# Patient Record
Sex: FEMALE | Race: WHITE | NOT HISPANIC OR LATINO | URBAN - METROPOLITAN AREA
[De-identification: names, ages, dates, MRNs, and addresses within clinical notes are randomized per-mention and may not be internally consistent; named-entity substitution may affect disease eponyms.]

---

## 2017-02-10 ENCOUNTER — IMPORTED ENCOUNTER (OUTPATIENT)
Dept: URBAN - METROPOLITAN AREA CLINIC 1 | Facility: CLINIC | Age: 67
End: 2017-02-10

## 2017-02-10 PROBLEM — H04.123: Noted: 2017-02-10

## 2017-02-10 PROBLEM — H43.811: Noted: 2017-02-10

## 2017-02-10 PROBLEM — H16.143: Noted: 2017-02-10

## 2017-02-10 PROBLEM — H25.813: Noted: 2017-02-10

## 2017-02-10 PROCEDURE — 92012 INTRM OPH EXAM EST PATIENT: CPT

## 2017-02-10 NOTE — PATIENT DISCUSSION
1.  PVD w/o Tear OD - Patient was cautioned to call our office immediately if they experience   a substantial change in their symptoms such as an increase in floaters persistent flashes loss of visual field (may appear as a shadow or a curtain) or decrease in visual acuity as these may indicate a retinal tear or detachment. 2.  STANLEY w/ increased PEK OU- Recommend increase ATs QID OU routinely 3. Cataract OU: Observe for now without intervention. The patient was advised to contact us if any change or worsening of vision4. H/o PVD OS 5. H/o Plaquenil screening without evidence of Toxicity. Return for an appointment in 10/dfe/glapedro luis/10-2 [facility] for Return as scheduled with Dr. Perla La.

## 2017-02-15 ENCOUNTER — IMPORTED ENCOUNTER (OUTPATIENT)
Dept: URBAN - METROPOLITAN AREA CLINIC 1 | Facility: CLINIC | Age: 67
End: 2017-02-15

## 2017-02-15 PROBLEM — H53.19: Noted: 2017-02-15

## 2017-02-15 PROBLEM — H04.123: Noted: 2017-02-15

## 2017-02-15 PROBLEM — H43.813: Noted: 2017-02-15

## 2017-02-15 PROBLEM — H25.813: Noted: 2017-02-15

## 2017-02-15 PROBLEM — H16.143: Noted: 2017-02-15

## 2017-02-15 PROCEDURE — 92012 INTRM OPH EXAM EST PATIENT: CPT

## 2017-02-15 NOTE — PATIENT DISCUSSION
1.  PVD w/o Tear OU with flashing Photopsia OD: Patient was cautioned to call our office immediately if they experience a substantial change in their symptoms such as an increase in floaters persistent flashes loss of visual field (may appear as a shadow or a curtain) or decrease in visual acuity as these may indicate a retinal tear or detachment. 2.  STANLEY w/ PEK OU-The use/continuation of artificial tears were recommended. 3.  Cataract OU: Observe for now without intervention. The patient was advised to contact us if any change or worsening of vision4. H/o Plaquenil screening without evidence of Toxicity. Return for an appointment for Return as scheduled with Dr. Zi Khalil.

## 2017-03-03 ENCOUNTER — IMPORTED ENCOUNTER (OUTPATIENT)
Dept: URBAN - METROPOLITAN AREA CLINIC 1 | Facility: CLINIC | Age: 67
End: 2017-03-03

## 2017-03-03 PROBLEM — H25.813: Noted: 2017-03-03

## 2017-03-03 PROBLEM — H04.123: Noted: 2017-03-03

## 2017-03-03 PROBLEM — Z79.899: Noted: 2017-03-03

## 2017-03-03 PROBLEM — H43.813: Noted: 2017-03-03

## 2017-03-03 PROBLEM — H16.143: Noted: 2017-03-03

## 2017-03-03 PROCEDURE — 92015 DETERMINE REFRACTIVE STATE: CPT

## 2017-03-03 PROCEDURE — 92083 EXTENDED VISUAL FIELD XM: CPT

## 2017-03-03 PROCEDURE — 83861 MICROFLUID ANALY TEARS: CPT

## 2017-03-03 PROCEDURE — 92012 INTRM OPH EXAM EST PATIENT: CPT

## 2017-03-03 NOTE — PATIENT DISCUSSION
1.  Lupus on High Risk Medication- Plaquenil without evidence of Toxicity or Plaquenil retinopathy. Given possible HVF defects OU will repeat HVF in 6 months. [id number] Susie Galvez for patient to remain on Plaquenil. 2.  STANLEY w/ decreased PEK OU- Improving. Moderate per tear lab OU. The increase of artificial tears OU Q2hwa were recommended. Cauterized LL's OU. Consider Restasis w/o improvement. 3. Cataract OU: Observe for now without intervention. The patient was advised to contact us if any change or worsening of vision4. PVD OU- old/stable. 5. Return for an appointment for a 30/Plaq HVF in 6 months with Dr. Ludmila Gastelum.

## 2017-09-12 ENCOUNTER — IMPORTED ENCOUNTER (OUTPATIENT)
Dept: URBAN - METROPOLITAN AREA CLINIC 1 | Facility: CLINIC | Age: 67
End: 2017-09-12

## 2017-09-12 PROBLEM — M32.9: Noted: 2017-09-12

## 2017-09-12 PROBLEM — H16.143: Noted: 2017-09-12

## 2017-09-12 PROBLEM — Z79.899: Noted: 2017-09-12

## 2017-09-12 PROBLEM — H25.813: Noted: 2017-09-12

## 2017-09-12 PROBLEM — H04.123: Noted: 2017-09-12

## 2017-09-12 PROBLEM — H43.813: Noted: 2017-09-12

## 2017-09-12 PROCEDURE — 92083 EXTENDED VISUAL FIELD XM: CPT

## 2017-09-12 PROCEDURE — 92015 DETERMINE REFRACTIVE STATE: CPT

## 2017-09-12 PROCEDURE — 92014 COMPRE OPH EXAM EST PT 1/>: CPT

## 2017-09-12 NOTE — PATIENT DISCUSSION
1.  High Risk Medication- Plaquenil due to systemic Lupus without evidence of Toxicity or Plaquenil retinopathy on High Risk Medication. [id number] Susie Galvez for patient to remain on Plaquenil. VF shows no progression OU today 2. STANLEY w/ PEK OU- (H/o Cautery LLs OU) Cont ATs TID OU routinely. 3.  Cataract OU: Observe for now without intervention. The patient was advised to contact us if any change or worsening of vision4. PVD OU - RD precautions. Letter to PCP MRX for glasses/ CLs given Return for an appointment in 6 mo 10 dfe VF 10-2 glare OU with Dr. Carlos Ott.

## 2018-11-01 ENCOUNTER — IMPORTED ENCOUNTER (OUTPATIENT)
Dept: URBAN - METROPOLITAN AREA CLINIC 1 | Facility: CLINIC | Age: 68
End: 2018-11-01

## 2018-11-01 PROBLEM — H43.813: Noted: 2018-11-01

## 2018-11-01 PROBLEM — Z79.899: Noted: 2018-11-01

## 2018-11-01 PROBLEM — H04.123: Noted: 2018-11-01

## 2018-11-01 PROBLEM — H16.143: Noted: 2018-11-01

## 2018-11-01 PROBLEM — H25.813: Noted: 2018-11-01

## 2018-11-01 PROCEDURE — 92015 DETERMINE REFRACTIVE STATE: CPT

## 2018-11-01 PROCEDURE — 92083 EXTENDED VISUAL FIELD XM: CPT

## 2018-11-01 PROCEDURE — 92012 INTRM OPH EXAM EST PATIENT: CPT

## 2018-11-01 NOTE — PATIENT DISCUSSION
1.  Cataract OU:  Visually Significant secondary to glare discussed the risks benefits alternatives and limitations of cataract surgery. The patient stated a full understanding and a desire to proceed with the procedure. The patient will need to return for preop appointment with cataract measurements and to have any additional questions answered and start pre-operative eye drops as directed. Schedule phaco PCLOtherwise follow-up in 1 year for a 30/HVf 10-2 OU/glare 2. Lupus on high risk med- Plaquenil- without evidence of Toxicity or Plaquenil retinopathy on High Risk Medication. Normal HVF and color VA OU. [id number] Susie Galvez for patient to remain on Plaquenil. 3.  STANLEY w/ PEK OU- Progressed OU. The continuation of artificial tears OU QID were recommended routinely. 4.  PVD OU- Old stable. 5.  Return for an appointment for [facility] H&P with Dr. Eryn Everett.

## 2018-11-01 NOTE — PATIENT DISCUSSION
Plaquenil without evidence of Toxicity or Plaquenil retinopathy on High Risk Medication. [id number] Susie Galvez for patient to remain on Plaquenil.

## 2018-11-26 ENCOUNTER — IMPORTED ENCOUNTER (OUTPATIENT)
Dept: URBAN - METROPOLITAN AREA CLINIC 1 | Facility: CLINIC | Age: 68
End: 2018-11-26

## 2018-11-26 PROBLEM — H25.811: Noted: 2018-11-26

## 2018-11-26 PROCEDURE — 92136 OPHTHALMIC BIOMETRY: CPT

## 2018-11-26 NOTE — PATIENT DISCUSSION
Cataract OD: Visually Significant secondary to glare discussed the risks benefits alternatives and limitations of cataract surgery. The patient stated a full understanding and a desire to proceed with the procedure. The patient will need to start pre-operative eye drops as directed. Proceed w/ phaco PCL OD Pt understands they will need glasses post-op to achieve their best corrected vision. Return for an appointment for sx OD with Dr. Anika Bain.

## 2018-12-05 ENCOUNTER — IMPORTED ENCOUNTER (OUTPATIENT)
Dept: URBAN - METROPOLITAN AREA CLINIC 1 | Facility: CLINIC | Age: 68
End: 2018-12-05

## 2018-12-06 ENCOUNTER — IMPORTED ENCOUNTER (OUTPATIENT)
Dept: URBAN - METROPOLITAN AREA CLINIC 1 | Facility: CLINIC | Age: 68
End: 2018-12-06

## 2018-12-06 PROBLEM — Z96.1: Noted: 2018-12-06

## 2018-12-06 PROCEDURE — 99024 POSTOP FOLLOW-UP VISIT: CPT

## 2018-12-06 NOTE — PATIENT DISCUSSION
POD#1 CE/IOL Toric OD doing well. Continue all 3 gtts as prescribed and until gone.  Durezol BID OD Ilevro Qdaily OD Ocuflox TID OD Post op Warnings Reiterated RTC as scheduled

## 2018-12-11 ENCOUNTER — IMPORTED ENCOUNTER (OUTPATIENT)
Dept: URBAN - METROPOLITAN AREA CLINIC 1 | Facility: CLINIC | Age: 68
End: 2018-12-11

## 2018-12-11 PROBLEM — Z96.1: Noted: 2018-12-11

## 2018-12-11 PROBLEM — H25.812: Noted: 2018-12-11

## 2018-12-11 PROCEDURE — 92136 OPHTHALMIC BIOMETRY: CPT

## 2018-12-11 NOTE — PATIENT DISCUSSION
1.  Cataract OS: Visually Significant secondary to glare discussed the risks benefits alternatives and limitations of cataract surgery. The patient stated a full understanding and a desire to proceed with the procedure. The patient will need to start pre-operative eye drops as directed. Proceed w/ phaco PCL OS Pt understands they will need glasses post-op to achieve their best corrected vision. 2.  POW#1  CE/IOL Toric OD doing well. Discontinue OcufloxContinue Durezol BID until gone. Continue Acular QD until gone. Return for an appointment for sx OS with Dr. Rufina Sinha.

## 2018-12-11 NOTE — PATIENT DISCUSSION
POW#1  CE/IOL OD doing well. Discontinue OcufloxContinue Lotemax/Durezol/Prednisolone BID until gone. Continue Prolensa/Ilevro/Acular QD until gone.

## 2018-12-19 ENCOUNTER — IMPORTED ENCOUNTER (OUTPATIENT)
Dept: URBAN - METROPOLITAN AREA CLINIC 1 | Facility: CLINIC | Age: 68
End: 2018-12-19

## 2018-12-20 ENCOUNTER — IMPORTED ENCOUNTER (OUTPATIENT)
Dept: URBAN - METROPOLITAN AREA CLINIC 1 | Facility: CLINIC | Age: 68
End: 2018-12-20

## 2018-12-20 PROBLEM — Z96.1: Noted: 2018-12-20

## 2018-12-20 PROCEDURE — 99024 POSTOP FOLLOW-UP VISIT: CPT

## 2018-12-20 NOTE — PATIENT DISCUSSION
POD#1 CE/IOL OS doing well. Continue all 3 gtts as prescribed and until gone. Post op Warnings Reiterated RTC as scheduled

## 2019-01-10 ENCOUNTER — IMPORTED ENCOUNTER (OUTPATIENT)
Dept: URBAN - METROPOLITAN AREA CLINIC 1 | Facility: CLINIC | Age: 69
End: 2019-01-10

## 2019-01-10 PROBLEM — Z09: Noted: 2019-01-10

## 2019-01-10 PROCEDURE — 99024 POSTOP FOLLOW-UP VISIT: CPT

## 2019-01-10 NOTE — PATIENT DISCUSSION
POW#3 Phaco/ PCL OU (Toric OU) doing well *Midrange OD* Finish PO meds per schedule MRX for glasses givenReturn for an appointment in November 30 VF 10-2 OU with Dr. Claudeen Cobble.

## 2019-10-25 ENCOUNTER — IMPORTED ENCOUNTER (OUTPATIENT)
Dept: URBAN - METROPOLITAN AREA CLINIC 1 | Facility: CLINIC | Age: 69
End: 2019-10-25

## 2019-10-25 PROBLEM — H00.032: Noted: 2019-10-25

## 2019-10-25 PROCEDURE — 92012 INTRM OPH EXAM EST PATIENT: CPT

## 2019-10-25 NOTE — PATIENT DISCUSSION
1. Abscessed Chalazion RLL - Begin Doxycycline 50mg po BID (Disp 60) Rx sent to patient's pharmacy). Begin Hot compresses TID x 5 minutes for 3 weeks. If without improvement discussed with patient possible Incision and Drainage procedure. Risks and benefits discussed with patient and patient states full understanding. Return as scheduled with Dr. Melanie Brooks.

## 2019-11-07 ENCOUNTER — IMPORTED ENCOUNTER (OUTPATIENT)
Dept: URBAN - METROPOLITAN AREA CLINIC 1 | Facility: CLINIC | Age: 69
End: 2019-11-07

## 2019-11-07 PROBLEM — H04.123: Noted: 2019-11-07

## 2019-11-07 PROBLEM — H16.143: Noted: 2019-11-07

## 2019-11-07 PROBLEM — H35.033: Noted: 2019-11-07

## 2019-11-07 PROBLEM — H01.001: Noted: 2019-11-07

## 2019-11-07 PROBLEM — H01.004: Noted: 2019-11-07

## 2019-11-07 PROBLEM — H43.813: Noted: 2019-11-07

## 2019-11-07 PROBLEM — M32.9: Noted: 2019-11-07

## 2019-11-07 PROBLEM — Z79.899: Noted: 2019-11-07

## 2019-11-07 PROBLEM — H00.12: Noted: 2019-11-07

## 2019-11-07 PROBLEM — Z96.1: Noted: 2019-11-07

## 2019-11-07 PROCEDURE — 92083 EXTENDED VISUAL FIELD XM: CPT

## 2019-11-07 PROCEDURE — 92014 COMPRE OPH EXAM EST PT 1/>: CPT

## 2019-11-07 NOTE — PATIENT DISCUSSION
1.   Plaquenil for Lupus without evidence of Toxicity or Plaquenil retinopathy on High Risk Medication. [id number] Susie Galvez for patient to remain on Plaquenil. 2.  Chalazion right lower eyelid - Much improved. Decrease Doxycycline PO QD until gone. Hot compresses TID x 5 minutes for 3 weeks. If without improvement discussed with patient possible Incision and Drainage procedure. Risks and benefits discussed with patient and patient states full understanding. 3. STANLEY w/ PEK OU- (LL cauterized OU) Recommend ATs TID OU routinely 4. Pseudophakia OU - (Toric OU) *Mid-Range OD 5. Anterior Blepharitis OU - Daily Hot compresses and lid scrubs were recommended. 6. GR I Hypertensive Retinopathy OU- Stable continue HTN Control7. PVD OU - RD precautions. Patient deferred Manifest Rx today. Return for an appointment in 1 year 30/10-2 HVF with Dr. Meagan Clarke.

## 2020-11-12 ENCOUNTER — IMPORTED ENCOUNTER (OUTPATIENT)
Dept: URBAN - METROPOLITAN AREA CLINIC 1 | Facility: CLINIC | Age: 70
End: 2020-11-12

## 2020-11-12 PROBLEM — Z79.899: Noted: 2020-11-12

## 2020-11-12 PROBLEM — H16.143: Noted: 2020-11-12

## 2020-11-12 PROBLEM — H04.123: Noted: 2020-11-12

## 2020-11-12 PROBLEM — H35.3131: Noted: 2020-11-12

## 2020-11-12 PROCEDURE — 92083 EXTENDED VISUAL FIELD XM: CPT

## 2020-11-12 PROCEDURE — 92014 COMPRE OPH EXAM EST PT 1/>: CPT

## 2020-11-12 NOTE — PATIENT DISCUSSION
1.   Plaquenil for Lupus without evidence of Toxicity or Plaquenil retinopathy on High Risk Medication. HVF WNL OU today. [id number] Susie Galvez for patient to remain on Plaquenil. 2.  ARMD OU *very early*/dry/stable. Importance of daily AREDS II study multivitamin and Amsler Grid checks discussed with patient. Patient to follow-up immediately with any new onset of decreased vision and/or metamorphopsia. 3.  STANLEY w/ PEK OU- (LL cauterized OU) Recommend ATs TID OU routinely 4. Pseudophakia OU - (Toric OU) *Mid-Range OD 5. Anterior Blepharitis OU - Daily Hot compresses and lid scrubs were recommended. 6. Chalazion right lower eyelid - Stable. Hot compresses TID x 5 minutes PRN. 7.  GR I Hypertensive Retinopathy OU- Stable continue HTN Control8. PVD OU - old stable. RD precautions. Patient deferred Manifest Rx today. Return for an appointment in 6 months 10/DFE/[place] with Dr. Marcelle Mims.

## 2021-05-13 ENCOUNTER — IMPORTED ENCOUNTER (OUTPATIENT)
Dept: URBAN - METROPOLITAN AREA CLINIC 1 | Facility: CLINIC | Age: 71
End: 2021-05-13

## 2021-05-13 PROBLEM — H35.3131: Noted: 2021-05-13

## 2021-05-13 PROBLEM — H04.123: Noted: 2021-05-13

## 2021-05-13 PROBLEM — H16.143: Noted: 2021-05-13

## 2021-05-13 PROCEDURE — 92250 FUNDUS PHOTOGRAPHY W/I&R: CPT

## 2021-05-13 PROCEDURE — 99213 OFFICE O/P EST LOW 20 MIN: CPT

## 2021-05-13 NOTE — PATIENT DISCUSSION
1.  ARMD OU Early/dry/stable. Importance of daily AREDS II study multivitamin and Amsler Grid checks discussed with patient. Patient to follow-up immediately with any new onset of decreased vision and/or metamorphopsia. 2. STANLEY w/ PEK OU- (LL cauterized OU) Recommend ATs QID OU routinely 3. Pseudophakia OU - (Toric OU) *Mid-Range OD 4. Anterior Blepharitis OU - Daily Hot compresses and lid scrubs were recommended. 5. GR I Hypertensive Retinopathy OU- Stable continue HTN Control6. PVD OU - old stable. RD precautions. 7.  Plaquenil use for LupusReturn for an appointment in 6 months 30/10-2 HVF with Dr. Zahra Lindsey.

## 2021-11-23 ENCOUNTER — IMPORTED ENCOUNTER (OUTPATIENT)
Dept: URBAN - METROPOLITAN AREA CLINIC 1 | Facility: CLINIC | Age: 71
End: 2021-11-23

## 2021-11-23 ENCOUNTER — PREPPED CHART (OUTPATIENT)
Dept: URBAN - METROPOLITAN AREA CLINIC 1 | Facility: CLINIC | Age: 71
End: 2021-11-23

## 2021-11-23 PROBLEM — H04.123: Noted: 2021-11-23

## 2021-11-23 PROBLEM — Z79.899: Noted: 2021-11-23

## 2021-11-23 PROBLEM — H35.3131: Noted: 2021-11-23

## 2021-11-23 PROBLEM — H16.143: Noted: 2021-11-23

## 2021-11-23 PROCEDURE — 99214 OFFICE O/P EST MOD 30 MIN: CPT

## 2021-11-23 PROCEDURE — 92083 EXTENDED VISUAL FIELD XM: CPT

## 2021-11-23 NOTE — PATIENT DISCUSSION
1.   Plaquenil without evidence of Toxicity or Plaquenil retinopathy on High Risk Medication for Lupus. [id number] Susie Galvez for patient to remain on Plaquenil. 2.  ARMD OU early/dry/stable. Importance of daily AREDS II study multivitamin and Amsler Grid checks discussed with patient. Patient to follow-up immediately with any new onset of decreased vision and/or metamorphopsia. 3. STANLEY w/ PEK OU- (LL cauterized OU) Recommend ATs QID OU routinely 4. Pseudophakia OU - (Toric OU) *Mid-Range OD 5. Anterior Blepharitis OU - Daily Hot compresses and lid scrubs were recommended. 6. GR I Hypertensive Retinopathy OU- Stable continue HTN Control7. PVD OU - old stable. RD precautions. Return for an appointment in 6 months DFE with Dr. Fabiana Jasmine.

## 2022-02-02 ENCOUNTER — IMPORTED ENCOUNTER (OUTPATIENT)
Dept: URBAN - METROPOLITAN AREA CLINIC 1 | Facility: CLINIC | Age: 72
End: 2022-02-02

## 2022-02-02 PROBLEM — H10.021: Noted: 2022-02-02

## 2022-02-02 PROCEDURE — 99213 OFFICE O/P EST LOW 20 MIN: CPT

## 2022-02-02 NOTE — PATIENT DISCUSSION
1.  Bacterial Conjunctivitis OD -- Begin Tobradex QID OD (Erx'd). Per RBF if pt feels discomfort OS along the same symptoms as OD begin Tobradex QID OU. The condition was discussed with the patient. The mechanism of transmission was explained. The patient was advised to wash his hands and use separate towels and bedding. 2. STANLEY w/ increased PEK OU (LL cauterized OU) -- Switch to PF ATs and increase to Q2Hrs OU. Recommend Gel ATs QHS OU. 3.  Anterior Blepharitis OU -- Daily Hot compresses and lid scrubs were recommended. 4. Pseudophakia OU (Toric OU; *Mid-Range OD) 5. H/o Plaquenil (Lupus) w/o Toxicity 6. H/o ARMD OU Early/dry/stable. 7.  H/o GR I Hypertensive Retinopathy OU 8. H/o PVD OU  Return for an appointment in 1 week 10 with Dr. Arpan Castrejon.

## 2022-02-10 ASSESSMENT — TONOMETRY
OS_IOP_MMHG: 15
OD_IOP_MMHG: 14

## 2022-02-10 ASSESSMENT — VISUAL ACUITY
OS_CC: 20/20
OD_CC: 20/20

## 2022-02-11 ENCOUNTER — FOLLOW UP (OUTPATIENT)
Dept: URBAN - METROPOLITAN AREA CLINIC 1 | Facility: CLINIC | Age: 72
End: 2022-02-11

## 2022-02-11 DIAGNOSIS — H04.123: ICD-10-CM

## 2022-02-11 DIAGNOSIS — H10.023: ICD-10-CM

## 2022-02-11 DIAGNOSIS — H16.143: ICD-10-CM

## 2022-02-11 PROCEDURE — 99213 OFFICE O/P EST LOW 20 MIN: CPT

## 2022-02-11 ASSESSMENT — VISUAL ACUITY
OD_SC: 20/100
OD_PH: 20/20
OS_SC: 20/20-1

## 2022-02-11 ASSESSMENT — TONOMETRY
OD_IOP_MMHG: 14
OS_IOP_MMHG: 14

## 2022-02-11 NOTE — PATIENT DISCUSSION
Improving. Since last exam PT started Tobradex in OS also. Decrease Tobradex to BID OU x 1 week then D/c.

## 2022-03-12 ASSESSMENT — TONOMETRY
OS_IOP_MMHG: 14
OD_IOP_MMHG: 14
OS_IOP_MMHG: 15
OD_IOP_MMHG: 14
OD_IOP_MMHG: 15
OD_IOP_MMHG: 14
OD_IOP_MMHG: 13
OS_IOP_MMHG: 14
OS_IOP_MMHG: 16
OD_IOP_MMHG: 13
OD_IOP_MMHG: 14
OD_IOP_MMHG: 14
OS_IOP_MMHG: 15
OD_IOP_MMHG: 14
OS_IOP_MMHG: 13
OD_IOP_MMHG: 14
OD_IOP_MMHG: 15
OD_IOP_MMHG: 15
OS_IOP_MMHG: 15
OD_IOP_MMHG: 17
OS_IOP_MMHG: 13
OS_IOP_MMHG: 15
OS_IOP_MMHG: 15
OS_IOP_MMHG: 16
OD_IOP_MMHG: 16
OS_IOP_MMHG: 14

## 2022-03-12 ASSESSMENT — VISUAL ACUITY
OD_SC: 20/30
OS_SC: 20/30
OS_CC: 20/30-2
OS_CC: J1
OS_CC: 20/25
OD_CC: 20/40
OS_PH: SC 20/25 -2
OS_SC: 20/20
OS_CC: 20/20
OS_GLARE: 20/50
OD_CC: 20/100
OD_CC: 20/40
OS_CC: J1
OD_GLARE: 20/40
OD_SC: J1
OS_SC: 20/30
OD_SC: 20/25
OD_SC: 20/30
OD_SC: 20/20
OS_SC: 20/20
OD_CC: 20/100+1
OS_CC: J2
OD_SC: 20/25
OD_CC: J1
OD_SC: J1
OS_CC: 20/25
OS_SC: 20/25
OS_GLARE: 20/60
OS_GLARE: 20/40
OS_SC: 20/20
OD_SC: 20/20
OD_CC: 20/100
OS_SC: 20/30
OD_GLARE: 20/60
OD_SC: 20/20
OD_GLARE: 20/50
OD_CC: 20/60
OD_CC: 20/70
OD_CC: 20/40+1
OD_PH: SC 20/40
OD_SC: 20/30
OD_SC: 20/25
OD_CC: J1
OS_CC: 20/40
OS_CC: 20/25
OS_SC: 20/25

## 2022-03-12 ASSESSMENT — KERATOMETRY
OD_K1POWER_DIOPTERS: 43.75
OS_K2POWER_DIOPTERS: 43.25
OS_AXISANGLE_DEGREES: 141
OD_AXISANGLE2_DEGREES: 113
OD_AXISANGLE_DEGREES: 023
OD_K2POWER_DIOPTERS: 43.00
OS_K1POWER_DIOPTERS: 44.25
OS_AXISANGLE2_DEGREES: 051

## 2022-05-24 ENCOUNTER — COMPREHENSIVE EXAM (OUTPATIENT)
Dept: URBAN - METROPOLITAN AREA CLINIC 1 | Facility: CLINIC | Age: 72
End: 2022-05-24

## 2022-05-24 DIAGNOSIS — Z79.899: ICD-10-CM

## 2022-05-24 DIAGNOSIS — H35.3131: ICD-10-CM

## 2022-05-24 PROCEDURE — 99213 OFFICE O/P EST LOW 20 MIN: CPT

## 2022-05-24 ASSESSMENT — VISUAL ACUITY
OD_PH: 20/25
OD_CC: 20/20
OD_SC: 20/50
OS_SC: 20/20

## 2022-05-24 ASSESSMENT — TONOMETRY
OD_IOP_MMHG: 14
OS_IOP_MMHG: 14

## 2022-05-24 NOTE — PATIENT DISCUSSION
(without) Plaquenil without evidence of Toxicity or Plaquenil retinopathy on High Risk Medication. [id number] Susie Galvez for patient to remain on Plaquenil.

## 2022-12-01 ENCOUNTER — COMPREHENSIVE EXAM (OUTPATIENT)
Dept: URBAN - METROPOLITAN AREA CLINIC 1 | Facility: CLINIC | Age: 72
End: 2022-12-01

## 2022-12-01 PROCEDURE — 99214 OFFICE O/P EST MOD 30 MIN: CPT

## 2022-12-01 PROCEDURE — 92015 DETERMINE REFRACTIVE STATE: CPT

## 2022-12-01 PROCEDURE — 92083 EXTENDED VISUAL FIELD XM: CPT

## 2022-12-01 ASSESSMENT — VISUAL ACUITY
OD_SC: J2
OS_SC: 20/20
OS_CC: J1
OS_CC: 20/20
OD_CC: J1
OD_CC: 20/25-2

## 2022-12-01 ASSESSMENT — TONOMETRY
OS_IOP_MMHG: 14
OD_IOP_MMHG: 14

## 2022-12-01 NOTE — PATIENT DISCUSSION
HVF today with non-specific defects but essentially normal OU. Plaquenil without evidence of Toxicity or Plaquenil retinopathy on High Risk Medication. [id number] Susie Galvez for patient to remain on Plaquenil.

## 2022-12-01 NOTE — PATIENT DISCUSSION
Stable today. Importance of daily AREDS II study multivitamin and Amsler Grid checks discussed with patient. Patient to follow-up immediately with any new onset of decreased vision and/or metamorphopsia.

## 2022-12-01 NOTE — PATIENT DISCUSSION
Continue ATs QID-Q2Hrs OU, routinely. Patient would like to try Restasis for better symptom control and frequency of ATs. Begin Restasis BID OU (Erx'd). Recommend instilling AT 5 mins before Restasis. Condsider Cequa if Restasis not covered.

## 2023-07-13 ENCOUNTER — FOLLOW UP (OUTPATIENT)
Dept: URBAN - METROPOLITAN AREA CLINIC 1 | Facility: CLINIC | Age: 73
End: 2023-07-13

## 2023-07-13 DIAGNOSIS — H04.123: ICD-10-CM

## 2023-07-13 DIAGNOSIS — H35.3131: ICD-10-CM

## 2023-07-13 DIAGNOSIS — H16.143: ICD-10-CM

## 2023-07-13 PROCEDURE — 99213 OFFICE O/P EST LOW 20 MIN: CPT

## 2023-07-13 ASSESSMENT — VISUAL ACUITY
OS_SC: J2
OS_SC: 20/25
OD_SC: 20/30
OD_SC: J2

## 2023-07-13 ASSESSMENT — TONOMETRY
OS_IOP_MMHG: 13
OD_IOP_MMHG: 13

## 2024-01-22 ENCOUNTER — COMPREHENSIVE EXAM (OUTPATIENT)
Dept: URBAN - METROPOLITAN AREA CLINIC 1 | Facility: CLINIC | Age: 74
End: 2024-01-22

## 2024-01-22 DIAGNOSIS — H16.143: ICD-10-CM

## 2024-01-22 DIAGNOSIS — Z79.899: ICD-10-CM

## 2024-01-22 DIAGNOSIS — H35.3132: ICD-10-CM

## 2024-01-22 DIAGNOSIS — M32.10: ICD-10-CM

## 2024-01-22 DIAGNOSIS — H43.813: ICD-10-CM

## 2024-01-22 DIAGNOSIS — H04.123: ICD-10-CM

## 2024-01-22 PROCEDURE — 99214 OFFICE O/P EST MOD 30 MIN: CPT

## 2024-01-22 PROCEDURE — 92083 EXTENDED VISUAL FIELD XM: CPT

## 2024-01-22 ASSESSMENT — TONOMETRY
OS_IOP_MMHG: 14
OD_IOP_MMHG: 14

## 2024-01-22 ASSESSMENT — VISUAL ACUITY
OD_SC: 20/40
OS_SC: 20/25

## 2024-08-30 ENCOUNTER — FOLLOW UP (OUTPATIENT)
Dept: URBAN - METROPOLITAN AREA CLINIC 1 | Facility: CLINIC | Age: 74
End: 2024-08-30

## 2024-08-30 DIAGNOSIS — H35.3132: ICD-10-CM

## 2024-08-30 PROCEDURE — 99213 OFFICE O/P EST LOW 20 MIN: CPT

## 2024-08-30 PROCEDURE — 92134 CPTRZ OPH DX IMG PST SGM RTA: CPT

## 2024-08-30 ASSESSMENT — TONOMETRY
OS_IOP_MMHG: 14
OD_IOP_MMHG: 14

## 2024-08-30 ASSESSMENT — VISUAL ACUITY
OS_SC: J7
OD_SC: J7
OS_SC: 20/25-1
OD_SC: 20/25

## 2024-11-19 ENCOUNTER — EMERGENCY VISIT (OUTPATIENT)
Dept: URBAN - METROPOLITAN AREA CLINIC 1 | Facility: CLINIC | Age: 74
End: 2024-11-19

## 2024-11-19 ENCOUNTER — PREPPED CHART (OUTPATIENT)
Dept: URBAN - METROPOLITAN AREA CLINIC 1 | Facility: CLINIC | Age: 74
End: 2024-11-19

## 2024-11-19 DIAGNOSIS — H16.143: ICD-10-CM

## 2024-11-19 DIAGNOSIS — H04.123: ICD-10-CM

## 2024-11-19 DIAGNOSIS — Z96.1: ICD-10-CM

## 2024-11-19 PROCEDURE — 92015 DETERMINE REFRACTIVE STATE: CPT

## 2024-11-19 PROCEDURE — 99214 OFFICE O/P EST MOD 30 MIN: CPT

## 2025-03-10 ENCOUNTER — COMPREHENSIVE EXAM (OUTPATIENT)
Age: 75
End: 2025-03-10

## 2025-03-10 DIAGNOSIS — H35.033: ICD-10-CM

## 2025-03-10 DIAGNOSIS — Z98.890: ICD-10-CM

## 2025-03-10 DIAGNOSIS — Z96.1: ICD-10-CM

## 2025-03-10 DIAGNOSIS — H16.143: ICD-10-CM

## 2025-03-10 DIAGNOSIS — H35.3132: ICD-10-CM

## 2025-03-10 DIAGNOSIS — H01.024: ICD-10-CM

## 2025-03-10 DIAGNOSIS — H01.021: ICD-10-CM

## 2025-03-10 DIAGNOSIS — M32.10: ICD-10-CM

## 2025-03-10 DIAGNOSIS — Z79.899: ICD-10-CM

## 2025-03-10 DIAGNOSIS — H04.123: ICD-10-CM

## 2025-03-10 DIAGNOSIS — H43.813: ICD-10-CM

## 2025-03-10 PROCEDURE — 99214 OFFICE O/P EST MOD 30 MIN: CPT

## 2025-03-10 PROCEDURE — 92134 CPTRZ OPH DX IMG PST SGM RTA: CPT

## 2025-03-10 PROCEDURE — 92083 EXTENDED VISUAL FIELD XM: CPT
